# Patient Record
Sex: MALE | Race: WHITE | Employment: UNEMPLOYED
[De-identification: names, ages, dates, MRNs, and addresses within clinical notes are randomized per-mention and may not be internally consistent; named-entity substitution may affect disease eponyms.]

---

## 2020-03-12 ENCOUNTER — TRANSCRIBE ORDERS (OUTPATIENT)
Dept: OTHER | Age: 35
End: 2020-03-12

## 2020-03-12 DIAGNOSIS — R32 URINARY INCONTINENCE, UNSPECIFIED TYPE: Primary | ICD-10-CM

## 2020-03-19 NOTE — TELEPHONE ENCOUNTER
MEDICAL RECORDS REQUEST   [facility]  Urology Clinic  [address] [place] [address]  PHONE: [phone]  Fax: [phone]        FUTURE VISIT INFORMATION                                                   Richy AgustinaDO corinaB: 1985 scheduled for future visit at [facility]    APPOINTMENT INFORMATION:    Date: 20 2PM     Provider:  Haritha Hong RN    Reason for Visit/Diagnosis: Urodynamic consult     REFERRAL INFORMATION:    Referring provider:  Laury Chavez     Specialty: MD    Referring providers clinic:  [facility] contact number:  [phone]    RECORDS REQUESTED FOR VISIT                                                     NOTES  STATUS/DETAILS   OFFICE NOTE from referring provider  yes   OFFICE NOTE from other specialist  no   DISCHARGE SUMMARY from hospital  no   DISCHARGE REPORT from the ER  no   OPERATIVE REPORT  no   MEDICATION LIST  no   LABS     URINALYSIS (UA)  yes     PRE-VISIT CHECKLIST      Record collection complete Yes - [facility] in [place]   Appointment appropriately scheduled           (right time/right provider) Yes   MyChart activation If no, please explain: In process    Questionnaire complete If no, please explain: In process      Completed by: Sonia Larry

## 2020-04-07 ENCOUNTER — TELEPHONE (OUTPATIENT)
Dept: UROLOGY | Facility: CLINIC | Age: 35
End: 2020-04-07

## 2020-04-07 PROBLEM — F43.24 ADJUSTMENT DISORDER WITH DISTURBANCE OF CONDUCT: Status: ACTIVE | Noted: 2017-12-21

## 2020-04-07 PROBLEM — K60.30 ANAL FISTULA: Status: ACTIVE | Noted: 2018-08-08

## 2020-04-07 PROBLEM — K21.9 GASTROESOPHAGEAL REFLUX DISEASE: Status: ACTIVE | Noted: 2018-06-13

## 2020-04-07 NOTE — TELEPHONE ENCOUNTER
Spoke with patient with the assistance of  services to convert their appointment to a Virtual Visit. Patient downloaded the application [employer].      Joselyn Kwong EMT

## 2020-04-17 ENCOUNTER — VIRTUAL VISIT (OUTPATIENT)
Dept: UROLOGY | Facility: CLINIC | Age: 35
End: 2020-04-17
Payer: MEDICAID

## 2020-04-17 ENCOUNTER — PRE VISIT (OUTPATIENT)
Dept: UROLOGY | Facility: CLINIC | Age: 35
End: 2020-04-17

## 2020-04-17 ENCOUNTER — TELEPHONE (OUTPATIENT)
Dept: UROLOGY | Facility: CLINIC | Age: 35
End: 2020-04-17

## 2020-04-17 DIAGNOSIS — R32 URINARY INCONTINENCE, UNSPECIFIED TYPE: Primary | ICD-10-CM

## 2020-04-17 NOTE — PATIENT INSTRUCTIONS
Please schedule an appointment for Urodynamics Study post Covid 19 quarantine.    It was a pleasure meeting with you today.  Thank you for allowing me and my team the privilege of caring for you today.  YOU are the reason we are here, and I truly hope we provided you with the excellent service you deserve.  Please let us know if there is anything else we can do for you so that we can be sure you are leaving completely satisfied with your care experience.        Nichole Ayala, CMA    Abdominal Pain, N/V/D

## 2020-04-17 NOTE — PROGRESS NOTES
"Richy Berrios is a 34 year old male who is being evaluated via a billable telephone visit.      The patient has been notified of following:     \"This telephone visit will be conducted via a call between you and your physician/provider. We have found that certain health care needs can be provided without the need for a physical exam.  This service lets us provide the care you need with a short phone conversation.  If a prescription is necessary we can send it directly to your pharmacy.  If lab work is needed we can place an order for that and you can then stop by our lab to have the test done at a later time.    Telephone visits are billed at different rates depending on your insurance coverage. During this emergency period, for some insurers they may be billed the same as an in-person visit.  Please reach out to your insurance provider with any questions.    If during the course of the call the physician/provider feels a telephone visit is not appropriate, you will not be charged for this service.\"    Patient has given verbal consent for Telephone visit?  Yes    How would you like to obtain your AVS? Mailed    Richy Berrios complains of: UDS consult     34 year old male with a history of autism, severe intellectual disability, and adjustment disorder who presents for UDS consult by phone. His brother, Velasquez, is his legal guardian, and speaks to me today on his behalf.    Per chart review, the patient was recently seen by [facility] urology for evaluation and treatment of an incidental bladder distention and dilation of the prostatic and bulbar urethra on recent CT. He had been seen in [facility] ED, with CT also showing evidence of foreign bodies in the stomach and colon. No obvious mass or foreign body within the urethra. Prior UA from 01/2020 showed no evidence of infection or hematuria. His brother reported that the patient has a longstanding history of urinary incontinence, which had worsened over the past several " months, requiring the use of pull-ups. Also voiding frequently during the day, about hourly. Furthermore, he also has a long history of constipation, with a BM every 2 days on average, for which he takes Miralax.     [facility] findings, which appeared to be WNL.    Today, his brother acknowledges an ongoing issue with incontinence and some reported pelvic discomfort. As [facility] not offer video [facility]rodynamics, he has been referred to our clinic to have this completed as soon as possible.     ALLERGIES  Doxycycline hyclate      Assessment/Plan:  34 year old male with a complex history, including autism, severe intellectual disability, and adjustment disorder, who have been experiencing urinary incontinence for several months, and was incidentally found to have bladder distention and dilation of the prostatic and bulbar urethra on recent CT. Previous cystoscopy WNL.     -Schedule next available video urodynamics post-COVID. Patient to then [facility] with [facility] to review results and determine next steps.       Phone call duration:  6 minutes    Haritha Hong CNP  Department of Urology